# Patient Record
Sex: FEMALE | Race: WHITE | NOT HISPANIC OR LATINO | Employment: OTHER | URBAN - METROPOLITAN AREA
[De-identification: names, ages, dates, MRNs, and addresses within clinical notes are randomized per-mention and may not be internally consistent; named-entity substitution may affect disease eponyms.]

---

## 2021-04-15 ENCOUNTER — TRANSFERRED RECORDS (OUTPATIENT)
Dept: HEALTH INFORMATION MANAGEMENT | Facility: CLINIC | Age: 81
End: 2021-04-15
Payer: MEDICARE

## 2022-05-14 ENCOUNTER — HOSPITAL ENCOUNTER (OUTPATIENT)
Facility: CLINIC | Age: 82
Setting detail: OBSERVATION
Discharge: HOME | End: 2022-05-17
Attending: EMERGENCY MEDICINE | Admitting: EMERGENCY MEDICINE
Payer: MEDICARE

## 2022-05-14 DIAGNOSIS — R41.0 CONFUSION: ICD-10-CM

## 2022-05-14 DIAGNOSIS — F22 PARANOID DISORDER (H): ICD-10-CM

## 2022-05-14 DIAGNOSIS — F03.91 DEMENTIA WITH BEHAVIORAL DISTURBANCE, UNSPECIFIED DEMENTIA TYPE: ICD-10-CM

## 2022-05-14 LAB
ALBUMIN SERPL-MCNC: 3.6 G/DL (ref 3.4–5)
ALBUMIN UR-MCNC: NEGATIVE MG/DL
ALP SERPL-CCNC: 89 U/L (ref 40–150)
ALT SERPL W P-5'-P-CCNC: 22 U/L (ref 0–50)
ANION GAP SERPL CALCULATED.3IONS-SCNC: 4 MMOL/L (ref 3–14)
APPEARANCE UR: CLEAR
AST SERPL W P-5'-P-CCNC: 18 U/L (ref 0–45)
BASOPHILS # BLD AUTO: 0 10E3/UL (ref 0–0.2)
BASOPHILS NFR BLD AUTO: 1 %
BILIRUB SERPL-MCNC: 0.4 MG/DL (ref 0.2–1.3)
BILIRUB UR QL STRIP: NEGATIVE
BUN SERPL-MCNC: 16 MG/DL (ref 7–30)
CALCIUM SERPL-MCNC: 8.7 MG/DL (ref 8.5–10.1)
CHLORIDE BLD-SCNC: 106 MMOL/L (ref 94–109)
CO2 SERPL-SCNC: 28 MMOL/L (ref 20–32)
COLOR UR AUTO: ABNORMAL
CREAT SERPL-MCNC: 0.75 MG/DL (ref 0.52–1.04)
EOSINOPHIL # BLD AUTO: 0.1 10E3/UL (ref 0–0.7)
EOSINOPHIL NFR BLD AUTO: 1 %
ERYTHROCYTE [DISTWIDTH] IN BLOOD BY AUTOMATED COUNT: 13.2 % (ref 10–15)
ETHANOL SERPL-MCNC: <0.01 G/DL
GFR SERPL CREATININE-BSD FRML MDRD: 80 ML/MIN/1.73M2
GLUCOSE BLD-MCNC: 106 MG/DL (ref 70–99)
GLUCOSE UR STRIP-MCNC: NEGATIVE MG/DL
HCT VFR BLD AUTO: 39.8 % (ref 35–47)
HGB BLD-MCNC: 12.8 G/DL (ref 11.7–15.7)
HGB UR QL STRIP: NEGATIVE
HYALINE CASTS: 1 /LPF
IMM GRANULOCYTES # BLD: 0 10E3/UL
IMM GRANULOCYTES NFR BLD: 0 %
KETONES UR STRIP-MCNC: 10 MG/DL
LEUKOCYTE ESTERASE UR QL STRIP: ABNORMAL
LYMPHOCYTES # BLD AUTO: 1.1 10E3/UL (ref 0.8–5.3)
LYMPHOCYTES NFR BLD AUTO: 21 %
MCH RBC QN AUTO: 29 PG (ref 26.5–33)
MCHC RBC AUTO-ENTMCNC: 32.2 G/DL (ref 31.5–36.5)
MCV RBC AUTO: 90 FL (ref 78–100)
MONOCYTES # BLD AUTO: 0.6 10E3/UL (ref 0–1.3)
MONOCYTES NFR BLD AUTO: 12 %
MUCOUS THREADS #/AREA URNS LPF: PRESENT /LPF
NEUTROPHILS # BLD AUTO: 3.3 10E3/UL (ref 1.6–8.3)
NEUTROPHILS NFR BLD AUTO: 65 %
NITRATE UR QL: NEGATIVE
NRBC # BLD AUTO: 0 10E3/UL
NRBC BLD AUTO-RTO: 0 /100
PH UR STRIP: 5 [PH] (ref 5–7)
PLATELET # BLD AUTO: 256 10E3/UL (ref 150–450)
POTASSIUM BLD-SCNC: 3.8 MMOL/L (ref 3.4–5.3)
PROT SERPL-MCNC: 7.4 G/DL (ref 6.8–8.8)
RBC # BLD AUTO: 4.41 10E6/UL (ref 3.8–5.2)
RBC URINE: <1 /HPF
SARS-COV-2 RNA RESP QL NAA+PROBE: NEGATIVE
SODIUM SERPL-SCNC: 138 MMOL/L (ref 133–144)
SP GR UR STRIP: 1.02 (ref 1–1.03)
SQUAMOUS EPITHELIAL: <1 /HPF
T4 FREE SERPL-MCNC: 0.83 NG/DL (ref 0.76–1.46)
TSH SERPL DL<=0.005 MIU/L-ACNC: 4.02 MU/L (ref 0.4–4)
UROBILINOGEN UR STRIP-MCNC: NORMAL MG/DL
WBC # BLD AUTO: 5.1 10E3/UL (ref 4–11)
WBC URINE: 12 /HPF

## 2022-05-14 PROCEDURE — 84439 ASSAY OF FREE THYROXINE: CPT | Performed by: EMERGENCY MEDICINE

## 2022-05-14 PROCEDURE — 99220 PR INITIAL OBSERVATION CARE,LEVEL III: CPT | Performed by: HOSPITALIST

## 2022-05-14 PROCEDURE — U0003 INFECTIOUS AGENT DETECTION BY NUCLEIC ACID (DNA OR RNA); SEVERE ACUTE RESPIRATORY SYNDROME CORONAVIRUS 2 (SARS-COV-2) (CORONAVIRUS DISEASE [COVID-19]), AMPLIFIED PROBE TECHNIQUE, MAKING USE OF HIGH THROUGHPUT TECHNOLOGIES AS DESCRIBED BY CMS-2020-01-R: HCPCS | Performed by: EMERGENCY MEDICINE

## 2022-05-14 PROCEDURE — G0378 HOSPITAL OBSERVATION PER HR: HCPCS

## 2022-05-14 PROCEDURE — 82077 ASSAY SPEC XCP UR&BREATH IA: CPT | Performed by: EMERGENCY MEDICINE

## 2022-05-14 PROCEDURE — 36415 COLL VENOUS BLD VENIPUNCTURE: CPT | Performed by: EMERGENCY MEDICINE

## 2022-05-14 PROCEDURE — 87086 URINE CULTURE/COLONY COUNT: CPT | Performed by: EMERGENCY MEDICINE

## 2022-05-14 PROCEDURE — C9803 HOPD COVID-19 SPEC COLLECT: HCPCS

## 2022-05-14 PROCEDURE — 80053 COMPREHEN METABOLIC PANEL: CPT | Performed by: EMERGENCY MEDICINE

## 2022-05-14 PROCEDURE — 84443 ASSAY THYROID STIM HORMONE: CPT | Performed by: EMERGENCY MEDICINE

## 2022-05-14 PROCEDURE — 81001 URINALYSIS AUTO W/SCOPE: CPT | Performed by: EMERGENCY MEDICINE

## 2022-05-14 PROCEDURE — 99285 EMERGENCY DEPT VISIT HI MDM: CPT | Mod: 25

## 2022-05-14 PROCEDURE — 85025 COMPLETE CBC W/AUTO DIFF WBC: CPT | Performed by: EMERGENCY MEDICINE

## 2022-05-14 RX ORDER — POLYETHYLENE GLYCOL 3350 17 G/17G
17 POWDER, FOR SOLUTION ORAL DAILY PRN
Status: DISCONTINUED | OUTPATIENT
Start: 2022-05-14 | End: 2022-05-17 | Stop reason: HOSPADM

## 2022-05-14 RX ORDER — AMOXICILLIN 250 MG
2 CAPSULE ORAL 2 TIMES DAILY PRN
Status: DISCONTINUED | OUTPATIENT
Start: 2022-05-14 | End: 2022-05-17 | Stop reason: HOSPADM

## 2022-05-14 RX ORDER — AMOXICILLIN 250 MG
1 CAPSULE ORAL 2 TIMES DAILY PRN
Status: DISCONTINUED | OUTPATIENT
Start: 2022-05-14 | End: 2022-05-17 | Stop reason: HOSPADM

## 2022-05-14 RX ORDER — ACETAMINOPHEN 650 MG/1
650 SUPPOSITORY RECTAL EVERY 6 HOURS PRN
Status: DISCONTINUED | OUTPATIENT
Start: 2022-05-14 | End: 2022-05-17 | Stop reason: HOSPADM

## 2022-05-14 RX ORDER — LIDOCAINE 40 MG/G
CREAM TOPICAL
Status: DISCONTINUED | OUTPATIENT
Start: 2022-05-14 | End: 2022-05-17 | Stop reason: HOSPADM

## 2022-05-14 RX ORDER — ACETAMINOPHEN 325 MG/1
650 TABLET ORAL EVERY 6 HOURS PRN
Status: DISCONTINUED | OUTPATIENT
Start: 2022-05-14 | End: 2022-05-17 | Stop reason: HOSPADM

## 2022-05-14 RX ORDER — ONDANSETRON 2 MG/ML
4 INJECTION INTRAMUSCULAR; INTRAVENOUS EVERY 6 HOURS PRN
Status: DISCONTINUED | OUTPATIENT
Start: 2022-05-14 | End: 2022-05-17 | Stop reason: HOSPADM

## 2022-05-14 RX ORDER — ONDANSETRON 4 MG/1
4 TABLET, ORALLY DISINTEGRATING ORAL EVERY 6 HOURS PRN
Status: DISCONTINUED | OUTPATIENT
Start: 2022-05-14 | End: 2022-05-17 | Stop reason: HOSPADM
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
## 2022-05-14 NOTE — ED NOTES
Bed: ED21  Expected date: 5/14/22  Expected time: 2:30 PM  Means of arrival: Ambulance  Comments:  514 82m psych  ETA 1446
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
## 2022-05-15 LAB
FOLATE SERPL-MCNC: 12.7 NG/ML
VIT B12 SERPL-MCNC: 506 PG/ML (ref 193–986)

## 2022-05-15 PROCEDURE — 36415 COLL VENOUS BLD VENIPUNCTURE: CPT | Performed by: HOSPITALIST

## 2022-05-15 PROCEDURE — 99225 PR SUBSEQUENT OBSERVATION CARE,LEVEL II: CPT | Performed by: PHYSICIAN ASSISTANT

## 2022-05-15 PROCEDURE — 82746 ASSAY OF FOLIC ACID SERUM: CPT | Performed by: HOSPITALIST

## 2022-05-15 PROCEDURE — 82607 VITAMIN B-12: CPT | Performed by: HOSPITALIST

## 2022-05-15 PROCEDURE — G0378 HOSPITAL OBSERVATION PER HR: HCPCS

## 2022-05-15 NOTE — PROGRESS NOTES
RECEIVING UNIT ED HANDOFF REVIEW    ED Nurse Handoff Report was reviewed by: Maribell Leahy RN on May 14, 2022 at 8:16 PM
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
## 2022-05-15 NOTE — PLAN OF CARE
Pt calm and cooperative, pleasant in conversation. No behaviors/agitation noted. A&Ox2, disoriented to time and situation. Assistance with meal ordering, excellent appetite. VSS on RA. Daughter updated via phone. PIV SL. Will continue to monitor. SW consult placed, discharge pending placement.

## 2022-05-15 NOTE — ED NOTES
"[facility]  ED Nurse Handoff Report    ED Chief complaint: Psychiatric Evaluation (Pt lives with daughter and son in law. Pt has had 2 episodes of \"getting angry\" at son in law over past couple days.No physical aggression. Pt AAOX2)      ED Diagnosis:   Final diagnoses:   Confusion   Paranoid disorder (H)   Dementia with behavioral disturbance, unspecified dementia type (H) - suspected       Code Status: Full Code    Allergies: Not on File    Patient Story: [place] daughters house as mental health deteriorating.  Focused Assessment:  Pt. Alert to self and place, but can make needs known. Pt. Up ad quentin, able to get self up to commode in room safely per self. Gait steady. Pt. Alert to self and does not know year or why she is here. Pt. Daughter updated and daughter will bring hearing aide and charging cord for cell phone. Pt. Denies any shortness of breath or pain.     Treatments and/or interventions provided: IV labs  Patient's response to treatments and/or interventions: Well    To be done/followed up on inpatient unit:  [facility]    Does this patient have any cognitive concerns?: Forgetful    Activity level - Baseline/Home:  Independent  Activity Level - Current:   Stand with Assist    Patient's Preferred language: English   Needed?: No    Isolation: None  Infection: Not Applicable  Patient tested for COVID 19 prior to admission: YES  Bariatric?: No    Vital Signs:   Vitals:    05/14/22 1453 05/14/22 1607   BP: (!) 147/78    Pulse: 79    Resp: 16    Temp: 98.2  F (36.8  C)    TempSrc: Oral    SpO2: 94%    Weight:  77.1 kg (170 lb)       Cardiac Rhythm:     Was the PSS-3 completed:   Yes  What interventions are required if any?               Family Comments: Daughter phone number in [place]  OBS brochure/video discussed/provided to patient/family: Yes              Name of person given brochure if not patient: NA              Relationship to patient: Na    For the majority " of the shift this patient's behavior was Green.   Behavioral interventions performed were None.    ED NURSE PHONE NUMBER: [phone]

## 2022-05-15 NOTE — PLAN OF CARE
Goal Outcome Evaluation:    2130-0700    Patient vital signs are at baseline: Yes  Patient able to ambulate as they were prior to admission or with assist devices provided by therapies during their stay:  Yes  Patient MUST void prior to discharge:  Yes  Patient able to tolerate oral intake:  Yes  Pain has adequate pain control using Oral analgesics:  No,  Reason:  Pt denies pain  Does patient have an identified :  Yes  Has goal D/C date and time been discussed with patient:  Yes       Pt is A&Ox2; self and time. Saline locked. Reg diet. Pt able to ambulate SBA of 1; steady. Pt has bilat hearing aids from home along with the . Pt is [facility] without hearing aids. Limb alert on Right arm due to hx of R breast mastectomy. Pt calm and cooperative with cares and assessments during this shift. Pt slept well throughout this shift.

## 2022-05-16 ENCOUNTER — APPOINTMENT (OUTPATIENT)
Dept: OCCUPATIONAL THERAPY | Facility: CLINIC | Age: 82
End: 2022-05-16
Attending: PHYSICIAN ASSISTANT
Payer: MEDICARE

## 2022-05-16 LAB — BACTERIA UR CULT: NO GROWTH

## 2022-05-16 PROCEDURE — G0378 HOSPITAL OBSERVATION PER HR: HCPCS

## 2022-05-16 PROCEDURE — 97165 OT EVAL LOW COMPLEX 30 MIN: CPT | Mod: GO | Performed by: OCCUPATIONAL THERAPIST

## 2022-05-16 PROCEDURE — 99226 PR SUBSEQUENT OBSERVATION CARE,LEVEL III: CPT | Performed by: INTERNAL MEDICINE

## 2022-05-16 PROCEDURE — 97535 SELF CARE MNGMENT TRAINING: CPT | Mod: GO | Performed by: OCCUPATIONAL THERAPIST

## 2022-05-16 RX ORDER — QUETIAPINE FUMARATE 25 MG/1
6.25 TABLET, FILM COATED ORAL 2 TIMES DAILY PRN
Qty: 15 TABLET | Refills: 1 | Status: SHIPPED | OUTPATIENT
Start: 2022-05-16

## 2022-05-16 NOTE — PROGRESS NOTES
A/O x2. VSS on RA. PIV SL. Libe alert R arm d/t mastectomy. [place] [facility] without hearing aids. BL hearing with her and . Appetite is good. Denies pain. No behaviors/aggitation during the shift. Awaiting psych consult and placement to LTC.

## 2022-05-16 NOTE — PLAN OF CARE
[facility]      OUTPATIENT OCCUPATIONAL THERAPY  EVALUATION  PLAN OF TREATMENT FOR OUTPATIENT REHABILITATION  (COMPLETE FOR INITIAL CLAIMS ONLY)  Patient's Last Name, First Name, M.I.  YOB: 1940  Manisha Moreno                          Provider's Name  [facility] Medical Record No.  [id number]                               Onset Date:  05/14/22   Start of Care Date:  05/16/22     Type:     ___PT   _X_OT   ___SLP Medical Diagnosis:  Cognitive decline                        OT Diagnosis:  Decline in cognition   Visits from SOC:  1   _________________________________________________________________________________  Plan of Treatment/Functional Goals    Planned Interventions: cognition   Goals: See Occupational Therapy Goals on Care Plan in [facility] electronic health record.    Therapy Frequency: One time eval and treatment  Predicted Duration of Therapy Intervention: 05/16/22  _________________________________________________________________________________    I CERTIFY THE NEED FOR THESE SERVICES FURNISHED UNDER        THIS PLAN OF TREATMENT AND WHILE UNDER MY CARE     (Physician co-signature of this document indicates review and certification of the therapy plan).              Certification date from: 05/16/22, Certification date to: 05/16/22    Referring Physician: Barthell, Joanna Kersten Ulmen, PA-C            Initial Assessment        See Occupational Therapy evaluation dated 05/16/22 in Epic electronic health record.
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
## 2022-05-16 NOTE — PLAN OF CARE
Goal Outcome Evaluation:      Alert and oriented x2/3. Up SBA. Regular diet. Psych consult pending. Discharge pending placement.

## 2022-05-16 NOTE — PLAN OF CARE
Pt calm and cooperative, pleasant in conversation. No behaviors/agitation noted. A&Ox2, disoriented to time and situation. Assistance with meal ordering, excellent appetite. VSS on RA. PIV SL. Will continue to monitor. SW following, psych consult completed see notes. Awaiting safe discharge disposition/placement.

## 2022-05-16 NOTE — PLAN OF CARE
Occupational Therapy Discharge Summary    Reason for therapy discharge:    All goals and outcomes met, no further needs identified.    Progress towards therapy goal(s). See goals on Care Plan in [facility] electronic health record for goal details.  Goals met    Therapy recommendation(s):    No further therapy is recommended. Patient recommended discharge to home with 24/7 assist or assisted living/memory care facility given a score of 10/30 on the SLUMS cognitive assessment indicating dementia level impairment.

## 2022-05-16 NOTE — PHARMACY-ADMISSION MEDICATION HISTORY
Pharmacy Medication History  Admission medication history interview status for the 5/14/2022  admission is complete. See EPIC admission navigator for prior to admission medications     Location of Interview: Phone  Medication history sources: Patient's family/friend (Daughter Pat), Surescripts and Care Everywhere    Significant changes made to the medication list:  none      Additional medication history information:   Confirmed with daughter that patient was not taking any prescription or OTC meds PTA.    Medication reconciliation completed by provider prior to medication history? No    Time spent in this activity: 15 mins    Prior to Admission medications    Not on File       The information provided in this note is only as accurate as the sources available at the time of update(s)     Henny Renner, BrodieD

## 2022-05-16 NOTE — PROGRESS NOTES
05/16/22 1000   Quick Adds   Type of Visit Initial Occupational Therapy Evaluation   Living Environment   People in Home child(bud), adult   Current Living Arrangements house   Living Environment Comments Patient appears to be a poor historian, unable to recall home set up. Per chart review, pt lives with her daughter and son in law. Pt has demonstrated cognitive decline and agitation but is otherwise at her baseline with ADLs and mobility without a gait aid   Self-Care   Usual Activity Tolerance good   Current Activity Tolerance good   Regular Exercise No   Equipment Currently Used at Home none   Fall history within last six months no  (Pt is a poor historian but there is no report of a fall)   Activity/Exercise/Self-Care Comment Pt is independent with ADLs and mobility at baseline   Instrumental Activities of Daily Living (IADL)   IADL Comments Appears pt family complete IADLs   General Information   Onset of Illness/Injury or Date of Surgery 05/14/22   Referring Physician Barthell, Joanna Kersten Ulmen, PA-C   Patient/Family Therapy Goal Statement (OT) Pt would like to improve socialization, move out of daughter's home   Additional Occupational Profile Info/Pertinent History of Current Problem 82 year old female admitted on 5/14/2022.  No clear past medical history other than a prior mastectomy for presumed breast cancer who presents with agitation.  Was seen in outside ED last night for same, family now reporting they are unable to care for her at home.  Was unable to contact daughter for information regarding today's events.   Existing Precautions/Restrictions fall   Limitations/Impairments safety/cognitive   Cognitive Status Examination   Orientation Status person   Behavioral Issues verbal outbursts;overwhelmed easily   Affect/Mental Status (Cognitive) confused;emotionally labile   Follows Commands follows one-step commands;over 90% accuracy   Safety Deficit severe deficit   Memory Deficit severe  deficit;short-term memory   Attention Deficit severe deficit;distractible in noisy environment;focused/sustained attention   Executive Function Deficit severe deficit;insight/awareness of deficits;judgment;problem-solving/reasoning;planning/decision-making   Cognitive Status Comments Pt scored 10/30 on the SLUMS indicating severe cognitive impairment consistent with dementia   Visual Perception   Visual Impairment/Limitations corrective lenses for reading   Sensory   Sensory Quick Adds No deficits were identified   Pain Assessment   Patient Currently in Pain No   Integumentary/Edema   Integumentary/Edema no deficits were identifed   Posture   Posture not impaired   Range of Motion Comprehensive   General Range of Motion no range of motion deficits identified   Strength Comprehensive (MMT)   General Manual Muscle Testing (MMT) Assessment no strength deficits identified   Coordination   Upper Extremity Coordination No deficits were identified   Bed Mobility   Bed Mobility No deficits identified   Transfers   Transfers No deficits identified   Balance   Balance Assessment no deficits were identified   Activities of Daily Living   BADL Assessment/Intervention bathing;upper body dressing;lower body dressing;toileting   Bathing Assessment/Intervention   [place] Level (Bathing) supervision   Comment, (Bathing) Supervision required for cognitive deficits to ensure safety   Upper Body Dressing Assessment/Training   Comment, (Upper Body Dressing) Supervision required for cognitive deficits to ensure safety   [place] Level (Upper Body Dressing) supervision   Lower Body Dressing Assessment/Training   Comment, (Lower Body Dressing) Supervision required for cognitive deficits to ensure safety   [place] Level (Lower Body Dressing) supervision   Toileting   Comment, (Toileting) Supervision required for cognitive deficits to ensure safety   [place] Level (Toileting) supervision   Clinical Impression   Criteria  "for Skilled Therapeutic Interventions Met (OT) Yes, treatment indicated   OT Diagnosis Decline in cognition   Influenced by the following impairments Cognitive decline   OT Problem List-Impairments impacting ADL problems related to;cognition   Assessment of Occupational Performance 5 or more Performance Deficits   Identified Performance Deficits Supervision required with all ADLS due to cognitive impairment   Planned Therapy Interventions (OT) cognition   Clinical Decision Making Complexity (OT) low complexity   Anticipated Equipment Needs Upon Discharge (OT)   (N/A)   Risk & Benefits of therapy have been explained evaluation/treatment results reviewed;risks/benefits reviewed;care plan/treatment goals reviewed;participants voiced agreement with care plan;current/potential barriers reviewed;patient;participants included   OT Discharge Planning   OT Discharge Recommendation (DC Rec) Long term care facility  (Memory care facility, pt otherwise requires 24/7 assist at home for cognitive concerns)   OT Rationale for DC Rec Patient appears to be at her mobility and ADL baseline. Patient presents with significant cognitive impairment which appears chronic in nature, patient is not expected to make gains in therapy. Patient will require assist in a memory care facility or 24/7 assist at daughter's home to maintain safety. Per chart review, daughter is unable to provide this level of assist making long term care/memory care facility the safest discharge option.   OT Brief overview of current status SLUMS 10/30 indicating severe cognitive impairment. Patient appeared agreeable to discharging to an assisted living/memory care facility when therapist refered to these facilities as a \"senior living  community\". Patient appeared excited about the opportunity to socialize with \"people my age\"   Therapy Certification   Start of Care Date 05/16/22   Certification date from 05/16/22   Certification date to 05/16/22   Medical " Diagnosis Cognitive decline   Total Evaluation Time (Minutes)   Total Evaluation Time (Minutes) 21   OT Goals   Therapy Frequency (OT) One time eval and treatment   OT Predicted Duration/Target Date for Goal Attainment 05/16/22   OT Goals Cognition   OT: Cognitive Goal Met
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
## 2022-05-17 VITALS
TEMPERATURE: 98.3 F | SYSTOLIC BLOOD PRESSURE: 142 MMHG | WEIGHT: 170 LBS | RESPIRATION RATE: 18 BRPM | HEART RATE: 67 BPM | DIASTOLIC BLOOD PRESSURE: 68 MMHG | OXYGEN SATURATION: 94 %

## 2022-05-17 PROCEDURE — 99217 PR OBSERVATION CARE DISCHARGE: CPT | Performed by: INTERNAL MEDICINE

## 2022-05-17 PROCEDURE — G0378 HOSPITAL OBSERVATION PER HR: HCPCS

## 2022-05-17 NOTE — CONSULTS
Care Management Initial Consult    General Information  Assessment completed with: Patient, Children, Nita  Type of CM/SW Visit: Initial Assessment    Primary Care Provider verified and updated as needed:     Readmission within the last 30 days:        Reason for Consult: discharge planning  Advance Care Planning: Advance Care Planning Reviewed: no concerns identified          Communication Assessment  Patient's communication style: spoken language (English or Bilingual)    Hearing Difficulty or Deaf: yes        Cognitive  Cognitive/Neuro/Behavioral: WDL  Level of Consciousness: alert  Arousal Level: opens eyes spontaneously  Orientation: oriented x 4 (needs to look at board for date)  Mood/Behavior: calm, cooperative  Best Language: 0 - No aphasia  Speech: clear    Living Environment:   People in home: child(bud), adult  Nita  Current living Arrangements: house      Able to return to prior arrangements: yes       Family/Social Support:  Care provided by: child(bud)  Provides care for: no one  Marital Status:   Children          Description of Support System: Supportive, Involved    Support Assessment: Adequate family and caregiver support, Adequate social supports    Current Resources:   Patient receiving home care services: No     Community Resources:    Equipment currently used at home: none  Supplies currently used at home:      Employment/Financial:  Employment Status: retired        Financial Concerns:             Lifestyle & Psychosocial Needs:  Social Determinants of Health     Tobacco Use: Low Risk      Smoking Tobacco Use: Never Smoker     Smokeless Tobacco Use: Never Used   Alcohol Use: Not on file   Financial Resource Strain: Not on file   Food Insecurity: Not on file   Transportation Needs: Not on file   Physical Activity: Not on file   Stress: Not on file   Social Connections: Not on file   Intimate Partner Violence: Not on file   Depression: Not on file   Housing Stability: Not on file        Functional Status:  Prior to admission patient needed assistance:   Dependent ADLs:: Independent  Dependent IADLs:: Medication Management, Money Management, Shopping, Laundry       Mental Health Status:          Chemical Dependency Status:                Values/Beliefs:  Spiritual, Cultural Beliefs, Rastafari Practices, Values that affect care:                 Additional Information:  Per Care Management/Social Work consult for discharge planning patient admitted on 05/14/22 due to confusion. SW reviewed chart and spoke with patient/daughter. SW informed she is residing with her daughter April. SW discussed recommendation for LTC and was informed they are not wanting to pursue LTC at this time threat they can provide 24/7 support and will look into LTC for the future but do not want to pursue this at this time. SW informed by patient and daughter that they do not need any SW support moving forward. SW will continue to follow for discharge.    ZACHERY Ambrose    [facility]

## 2022-05-17 NOTE — PLAN OF CARE
Admitting Diagnosis: Confusion [R41.0]  Paranoid disorder (H) [F22]  Dementia with behavioral disturbance, unspecified dementia type (H) [F03.91]     Pt is alert & oriented x4. Calm & cooperative. VSS expt BPslightly elevated,  on RA. No behaviors/agitation noted during this shift .CMS intact.Standby assist.Voiding adequately. Tolerating diet.Saline locked.Denies pain. Slept well during this shift. Discharge pending.Will continue to monitor

## 2022-05-17 NOTE — PROGRESS NOTES
SW: Left message for daughter requesting to complete consult.    MARISOL AmbroseW    [facility]     Uncontrolled, changes made today: restart prevacid

## 2022-05-17 NOTE — PLAN OF CARE
"Goal Outcome Evaluation:    Patient admitted with agitation and violent outburst. Calm and cooperative.pleasant demeanor with writer. States she is in the hospital because she \"did something naughty.\" When writer asked if patient remembers event she says \"kind of.\" Up ambulating independently, continent of urine, BM yesterday. Discharge instructions reviewed with patient and daughter Nita. Explanation given regarding when to take Seroquel and that it is a 25mg tablet (patient will need to take 1/4). Plan is for patient and daughter to stay in hotel tonight and then go home. Patient is in the process of moving back to [place] per daughter.       "

## 2022-05-17 NOTE — PLAN OF CARE
Goal Outcome Evaluation:    Alert and oriented X4, Vss on room air, up with assistance of one with walker and gait belt.  Need help with meal order.  There is not behavioral issue during the shift.

## 2022-05-17 NOTE — PROGRESS NOTES
Care Management Discharge Note    Discharge Date: 05/17/2022       Discharge Disposition: Home    Discharge Services: None    Discharge DME: None    Discharge Transportation: family or friend will provide    Private pay costs discussed: Not applicable    PAS Confirmation Code:    Patient/family educated on Medicare website which has current facility and service quality ratings: yes    Education Provided on the Discharge Plan:  Yes  Persons Notified of Discharge Plans: Patient/Daughter  Patient/Family in Agreement with the Plan: yes    Handoff Referral Completed: Yes    Additional Information:  IESHA spoke with Dr who indicated patient is okay to discharge home with daughter. SW spoke with patient and daughter who are both in agreement with discharge home today with assist from family and indicated there are no additional services or supports that they would need.         ZACHERY Ambrose    [facility]
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
## 2022-05-18 ENCOUNTER — PATIENT OUTREACH (OUTPATIENT)
Dept: CARE COORDINATION | Facility: CLINIC | Age: 82
End: 2022-05-18
Payer: COMMERCIAL

## 2022-05-18 DIAGNOSIS — Z71.89 OTHER SPECIFIED COUNSELING: ICD-10-CM

## 2022-05-18 NOTE — PROGRESS NOTES
Clinic Care Coordination Contact  [facility]/Voicemail       Clinical Data: Care Coordinator Outreach  Outreach attempted x 1.  spoke to daughter Nita- she did not feel her Mom was up to any question's- There is no CTC in the chart- CTA will try back tomorrow.   Plan:  Care Coordinator will try to reach patient again in 1-2 business days.    Marquita Sales  Care Transitions Assistant  [facility]

## 2022-05-19 NOTE — PROGRESS NOTES
Clinic Care Coordination Contact  [facility]/Voicemail       Clinical Data: Care Coordinator Outreach  Outreach attempted x 2.  Left message on patient's voicemail with call back information and requested return call.  Plan:  Care Coordinator will do no further outreaches at this time.    Marquita Sales  Care Transitions Assistant  [facility]

## 2022-10-03 ENCOUNTER — HEALTH MAINTENANCE LETTER (OUTPATIENT)
Age: 82
End: 2022-10-03

## 2023-10-22 ENCOUNTER — HEALTH MAINTENANCE LETTER (OUTPATIENT)
Age: 83
End: 2023-10-22

## 2024-12-14 ENCOUNTER — HEALTH MAINTENANCE LETTER (OUTPATIENT)
Age: 84
End: 2024-12-14